# Patient Record
Sex: MALE | Race: WHITE | Employment: FULL TIME | URBAN - METROPOLITAN AREA
[De-identification: names, ages, dates, MRNs, and addresses within clinical notes are randomized per-mention and may not be internally consistent; named-entity substitution may affect disease eponyms.]

---

## 2018-07-16 ENCOUNTER — HOSPITAL ENCOUNTER (OUTPATIENT)
Dept: CT IMAGING | Age: 53
Discharge: HOME | End: 2018-07-16
Payer: SELF-PAY

## 2018-07-16 DIAGNOSIS — Z00.00 PREVENTATIVE HEALTH CARE: ICD-10-CM

## 2018-07-16 PROCEDURE — 75571 CT HRT W/O DYE W/CA TEST: CPT

## 2018-09-14 ENCOUNTER — TELEPHONE (OUTPATIENT)
Dept: CARDIOLOGY CLINIC | Age: 53
End: 2018-09-14

## 2018-09-14 NOTE — TELEPHONE ENCOUNTER
Identifiers x 2. Inquired to PCP, etc.  States seen by Dr. Clara Saravia in [place]. Confirmed appointment on 9-17-18.

## 2025-08-12 ENCOUNTER — TRANSCRIBE ORDERS (OUTPATIENT)
Facility: HOSPITAL | Age: 60
End: 2025-08-12

## 2025-08-12 DIAGNOSIS — K82.9 DISEASE OF GALLBLADDER: Primary | ICD-10-CM

## 2025-08-21 ENCOUNTER — HOSPITAL ENCOUNTER (OUTPATIENT)
Age: 60
Discharge: HOME | End: 2025-08-24
Payer: COMMERCIAL

## 2025-08-21 DIAGNOSIS — K82.9 DISEASE OF GALLBLADDER: ICD-10-CM

## 2025-08-21 PROCEDURE — 76700 US EXAM ABDOM COMPLETE: CPT
